# Patient Record
Sex: FEMALE | Race: WHITE | Employment: UNEMPLOYED | URBAN - METROPOLITAN AREA
[De-identification: names, ages, dates, MRNs, and addresses within clinical notes are randomized per-mention and may not be internally consistent; named-entity substitution may affect disease eponyms.]

---

## 2017-11-06 ENCOUNTER — HOSPITAL ENCOUNTER (OUTPATIENT)
Dept: MAMMOGRAPHY | Age: 57
Discharge: HOME | End: 2017-11-06
Attending: OBSTETRICS & GYNECOLOGY
Payer: COMMERCIAL

## 2017-11-06 DIAGNOSIS — N63.10 LUMP OF BREAST, RIGHT: ICD-10-CM

## 2017-11-06 PROCEDURE — 76642 ULTRASOUND BREAST LIMITED: CPT

## 2017-11-06 PROCEDURE — 77066 DX MAMMO INCL CAD BI: CPT

## 2018-01-09 PROBLEM — J30.89 CHRONIC NONSEASONAL ALLERGIC RHINITIS DUE TO POLLEN: Status: ACTIVE | Noted: 2018-01-09

## 2018-01-09 PROBLEM — I10 BENIGN ESSENTIAL HTN: Status: ACTIVE | Noted: 2018-01-09

## 2018-01-09 PROBLEM — Z78.0 POSTMENOPAUSAL: Status: ACTIVE | Noted: 2018-01-09

## 2018-01-09 PROBLEM — E78.00 PURE HYPERCHOLESTEROLEMIA: Status: ACTIVE | Noted: 2018-01-09

## 2018-01-09 PROBLEM — I35.1 MILD AORTIC INSUFFICIENCY: Status: ACTIVE | Noted: 2018-01-09

## 2018-01-30 ENCOUNTER — HOSPITAL ENCOUNTER (OUTPATIENT)
Dept: MAMMOGRAPHY | Age: 58
Discharge: HOME | End: 2018-01-30
Attending: OBSTETRICS & GYNECOLOGY
Payer: COMMERCIAL

## 2018-01-30 DIAGNOSIS — Z09 FOLLOW-UP EXAM, 3-6 MONTHS SINCE PREVIOUS EXAM: ICD-10-CM

## 2018-01-30 DIAGNOSIS — N63.10 BREAST MASS, RIGHT: ICD-10-CM

## 2018-01-30 PROCEDURE — 76642 ULTRASOUND BREAST LIMITED: CPT

## 2018-08-29 ENCOUNTER — HOSPITAL ENCOUNTER (OUTPATIENT)
Dept: MAMMOGRAPHY | Age: 58
Discharge: HOME | End: 2018-08-29
Attending: OBSTETRICS & GYNECOLOGY
Payer: COMMERCIAL

## 2018-08-29 DIAGNOSIS — Z09 FOLLOW-UP EXAM, 3-6 MONTHS SINCE PREVIOUS EXAM: ICD-10-CM

## 2018-08-29 DIAGNOSIS — N63.10 BREAST MASS, RIGHT: ICD-10-CM

## 2018-08-29 PROCEDURE — 76642 ULTRASOUND BREAST LIMITED: CPT

## 2018-09-06 PROBLEM — S93.401A SPRAIN OF RIGHT ANKLE: Status: ACTIVE | Noted: 2018-09-06

## 2019-01-14 ENCOUNTER — HOSPITAL ENCOUNTER (OUTPATIENT)
Dept: MAMMOGRAPHY | Age: 59
Discharge: HOME | End: 2019-01-14
Attending: OBSTETRICS & GYNECOLOGY
Payer: COMMERCIAL

## 2019-01-14 DIAGNOSIS — Z12.39 ENCOUNTER FOR SCREENING FOR MALIGNANT NEOPLASM OF BREAST: ICD-10-CM

## 2019-01-14 PROCEDURE — 77063 BREAST TOMOSYNTHESIS BI: CPT

## 2019-01-17 ENCOUNTER — HOSPITAL ENCOUNTER (OUTPATIENT)
Dept: MAMMOGRAPHY | Age: 59
Discharge: HOME | End: 2019-01-17
Attending: OBSTETRICS & GYNECOLOGY
Payer: COMMERCIAL

## 2019-01-17 DIAGNOSIS — R92.8 ABNORMAL SCREENING MAMMOGRAM: ICD-10-CM

## 2019-01-17 DIAGNOSIS — N63.0 BREAST LUMP: ICD-10-CM

## 2019-01-17 PROCEDURE — 77065 DX MAMMO INCL CAD UNI: CPT

## 2019-01-17 PROCEDURE — 76642 ULTRASOUND BREAST LIMITED: CPT

## 2019-01-25 ENCOUNTER — HOSPITAL ENCOUNTER (OUTPATIENT)
Dept: MAMMOGRAPHY | Age: 59
Discharge: HOME | End: 2019-01-25
Attending: OBSTETRICS & GYNECOLOGY
Payer: COMMERCIAL

## 2019-01-25 DIAGNOSIS — R92.8 ABNORMAL MAMMOGRAM OF LEFT BREAST: ICD-10-CM

## 2019-01-25 PROCEDURE — 19081 BX BREAST 1ST LESION STRTCTC: CPT

## 2019-01-25 PROCEDURE — 74011250636 HC RX REV CODE- 250/636: Performed by: OBSTETRICS & GYNECOLOGY

## 2019-01-25 PROCEDURE — 88305 TISSUE EXAM BY PATHOLOGIST: CPT

## 2019-01-25 PROCEDURE — 77065 DX MAMMO INCL CAD UNI: CPT

## 2019-01-25 PROCEDURE — 74011000250 HC RX REV CODE- 250: Performed by: OBSTETRICS & GYNECOLOGY

## 2019-01-25 RX ORDER — LIDOCAINE HYDROCHLORIDE 10 MG/ML
5 INJECTION INFILTRATION; PERINEURAL
Status: COMPLETED | OUTPATIENT
Start: 2019-01-25 | End: 2019-01-25

## 2019-01-25 RX ADMIN — SODIUM CHLORIDE 250 ML: 900 INJECTION, SOLUTION INTRAVENOUS at 10:00

## 2019-01-25 RX ADMIN — LIDOCAINE HYDROCHLORIDE 3 ML: 10 INJECTION, SOLUTION INFILTRATION; PERINEURAL at 10:00

## 2019-01-25 RX ADMIN — LIDOCAINE HYDROCHLORIDE 3 ML: 10; .005 INJECTION, SOLUTION EPIDURAL; INFILTRATION; INTRACAUDAL; PERINEURAL at 10:01

## 2019-09-03 ENCOUNTER — HOSPITAL ENCOUNTER (OUTPATIENT)
Dept: MAMMOGRAPHY | Age: 59
Discharge: HOME | End: 2019-09-03
Attending: OBSTETRICS & GYNECOLOGY
Payer: COMMERCIAL

## 2019-09-03 DIAGNOSIS — R59.9 ENLARGED LYMPH NODE: ICD-10-CM

## 2019-09-03 PROCEDURE — 76642 ULTRASOUND BREAST LIMITED: CPT

## 2019-09-03 PROCEDURE — 77065 DX MAMMO INCL CAD UNI: CPT

## 2020-02-01 ENCOUNTER — HOSPITAL ENCOUNTER (OUTPATIENT)
Dept: MAMMOGRAPHY | Age: 60
Discharge: HOME | End: 2020-02-01
Attending: OBSTETRICS & GYNECOLOGY
Payer: COMMERCIAL

## 2020-02-01 DIAGNOSIS — Z12.31 VISIT FOR SCREENING MAMMOGRAM: ICD-10-CM

## 2020-02-01 PROCEDURE — 77063 BREAST TOMOSYNTHESIS BI: CPT

## 2020-09-09 ENCOUNTER — HOSPITAL ENCOUNTER (OUTPATIENT)
Dept: MAMMOGRAPHY | Age: 60
Discharge: HOME | End: 2020-09-09
Attending: OBSTETRICS & GYNECOLOGY
Payer: COMMERCIAL

## 2020-09-09 DIAGNOSIS — N63.20 LEFT BREAST MASS: ICD-10-CM

## 2020-09-09 PROCEDURE — 76642 ULTRASOUND BREAST LIMITED: CPT

## 2020-09-09 PROCEDURE — 77066 DX MAMMO INCL CAD BI: CPT

## 2021-04-22 ENCOUNTER — TRANSCRIBE ORDER (OUTPATIENT)
Dept: SCHEDULING | Age: 61
End: 2021-04-22

## 2021-04-22 DIAGNOSIS — N60.19: Primary | ICD-10-CM

## 2021-06-02 ENCOUNTER — HOSPITAL ENCOUNTER (OUTPATIENT)
Dept: MAMMOGRAPHY | Age: 61
Discharge: HOME | End: 2021-06-02
Attending: OBSTETRICS & GYNECOLOGY
Payer: COMMERCIAL

## 2021-06-02 DIAGNOSIS — N60.19: ICD-10-CM

## 2021-06-02 PROCEDURE — 77066 DX MAMMO INCL CAD BI: CPT

## 2021-06-02 PROCEDURE — 76642 ULTRASOUND BREAST LIMITED: CPT

## 2022-02-08 ENCOUNTER — TRANSCRIBE ORDER (OUTPATIENT)
Dept: SCHEDULING | Age: 62
End: 2022-02-08

## 2022-02-08 DIAGNOSIS — Z12.39 ENCOUNTER FOR OTHER SCREENING FOR MALIGNANT NEOPLASM OF BREAST: Primary | ICD-10-CM

## 2022-03-19 PROBLEM — J30.89 CHRONIC NONSEASONAL ALLERGIC RHINITIS DUE TO POLLEN: Status: ACTIVE | Noted: 2018-01-09

## 2022-03-19 PROBLEM — S93.401A SPRAIN OF RIGHT ANKLE: Status: ACTIVE | Noted: 2018-09-06

## 2022-03-19 PROBLEM — I10 BENIGN ESSENTIAL HTN: Status: ACTIVE | Noted: 2018-01-09

## 2022-03-19 PROBLEM — Z78.0 POSTMENOPAUSAL: Status: ACTIVE | Noted: 2018-01-09

## 2022-03-19 PROBLEM — E78.00 PURE HYPERCHOLESTEROLEMIA: Status: ACTIVE | Noted: 2018-01-09

## 2022-03-20 PROBLEM — I35.1 MILD AORTIC INSUFFICIENCY: Status: ACTIVE | Noted: 2018-01-09

## 2022-03-21 ENCOUNTER — HOSPITAL ENCOUNTER (OUTPATIENT)
Dept: MAMMOGRAPHY | Age: 62
Discharge: HOME | End: 2022-03-21
Attending: PHYSICIAN ASSISTANT
Payer: COMMERCIAL

## 2022-03-21 DIAGNOSIS — Z12.39 ENCOUNTER FOR OTHER SCREENING FOR MALIGNANT NEOPLASM OF BREAST: ICD-10-CM

## 2022-03-21 PROCEDURE — 77063 BREAST TOMOSYNTHESIS BI: CPT

## 2022-12-15 ENCOUNTER — HOSPITAL ENCOUNTER (OUTPATIENT)
Dept: MAMMOGRAPHY | Age: 62
Discharge: HOME | End: 2022-12-15
Payer: COMMERCIAL

## 2022-12-15 ENCOUNTER — APPOINTMENT (OUTPATIENT)
Dept: MAMMOGRAPHY | Age: 62
End: 2022-12-15
Payer: COMMERCIAL

## 2022-12-15 DIAGNOSIS — N64.4 MASTODYNIA: ICD-10-CM

## 2022-12-15 PROCEDURE — 76642 ULTRASOUND BREAST LIMITED: CPT

## 2022-12-15 PROCEDURE — 77066 DX MAMMO INCL CAD BI: CPT

## 2023-02-20 ENCOUNTER — HOSPITAL ENCOUNTER (OUTPATIENT)
Dept: MAMMOGRAPHY | Age: 63
Discharge: HOME | End: 2023-02-23
Payer: COMMERCIAL

## 2023-02-20 DIAGNOSIS — N95.1 SYMPTOMATIC MENOPAUSAL OR FEMALE CLIMACTERIC STATES: ICD-10-CM

## 2023-02-20 DIAGNOSIS — N95.9 UNSPECIFIED MENOPAUSAL AND PERIMENOPAUSAL DISORDER: ICD-10-CM

## 2023-02-20 PROCEDURE — 77080 DXA BONE DENSITY AXIAL: CPT

## 2024-01-08 ENCOUNTER — HOSPITAL ENCOUNTER (OUTPATIENT)
Dept: MAMMOGRAPHY | Age: 64
Discharge: HOME | End: 2024-01-11
Payer: COMMERCIAL

## 2024-01-08 VITALS — BODY MASS INDEX: 20.72 KG/M2 | WEIGHT: 132 LBS | HEIGHT: 67 IN

## 2024-01-08 DIAGNOSIS — Z12.31 VISIT FOR SCREENING MAMMOGRAM: ICD-10-CM

## 2024-01-08 PROCEDURE — 77063 BREAST TOMOSYNTHESIS BI: CPT

## 2024-06-10 ENCOUNTER — HOSPITAL ENCOUNTER (OUTPATIENT)
Dept: PHYSICAL THERAPY | Age: 64
Setting detail: RECURRING SERIES
Discharge: HOME | End: 2024-06-13
Payer: COMMERCIAL

## 2024-06-10 DIAGNOSIS — N39.46 MIXED STRESS AND URGE URINARY INCONTINENCE: ICD-10-CM

## 2024-06-10 DIAGNOSIS — M62.81 MUSCLE WEAKNESS (GENERALIZED): Primary | ICD-10-CM

## 2024-06-10 PROCEDURE — 97530 THERAPEUTIC ACTIVITIES: CPT

## 2024-06-10 PROCEDURE — 97161 PT EVAL LOW COMPLEX 20 MIN: CPT

## 2024-06-10 ASSESSMENT — PAIN SCALES - GENERAL: PAINLEVEL_OUTOF10: 2

## 2024-06-10 NOTE — PROGRESS NOTES
Posture/Ergonomics     Diaphragmatic Breathing     Pain Science Education     Resources and Technology     Other Patient Education       MANUAL THERAPY: (0 minutes): Joint mobilization, soft tissue mobilization and manipulation was utilized and necessary because of the patient's restricted joint motion, painful spasm, loss of articular motion and restricted motion of soft tissue.     Date Type Location Comments   6/10/24 Internal assessment/treatment Via vaginal canal Global mms weakness / dec PFM bulk                (Used abbreviations: MET - muscle energy technique; SCS- Strain counter strain; CTM-Connective tissue mobilizations; CR- Contract/Relax; SP- Sustained pressure; PIT- Positional inhibition techniques; STM Soft -tissue mobilization; MM- Myofascial mobilization; TrP-Trigger point release; IASTM- Instrument assisted soft tissue mobilizations, TDN-Trigger point dry needling)      Treatment/Session Summary:    Treatment Assessment: See Evaluation Note from today. Patient presenting with under active pelvic floor with mild force production deficits. Instructed on performance of QF and long holds with her demonstrating appropriate coordination and needing minimal cueing to avoid compensation from glutes. Provided HEP for pt to practice until next session. Educated on techniques to help control urine throughout the day.  Communication/Consultation: Therapy Evaluation sent to referring provider  Equipment provided: HEP  Recommendations/Intent for next treatment session: Next visit will review her bladder diary and assess LE strength    >Total Treatment Billable Duration: 38 minutes  Time In: 1106  Time Out: 1201     Bell Johnson PT, DPT    Charge Capture  [employer] [facility] Portal  Appt Desk  Attendance Report      Future Appointments   Date Time Provider Department Center   6/24/2024  3:00 PM Bell Johnson PT [facility]   7/10/2024  1:00 PM Bell Johnson PT [facility]

## 2024-06-10 NOTE — THERAPY EVALUATION
above deficits affecting participation in basic ADLs and overall functional tolerance.     REASON FOR SERVICES/ OTHER COMMENTS:  Patient continues to require skilled intervention due to impairments affecting functional mobility.    Total Duration:  Time In: 1106  Time Out: 1201     Regarding Jane Díaz's therapy, I certify that the treatment plan above will be carried out by a therapist or under their direction.    Thank you for this referral,    Bell Johnson, PT, DPT    Referring Physician Signature: Geovanna Plaza MD  _______________________________ Date _____________        Post Session Pain  Charge Capture  PT Visit Info  MD Guidelines  MyChart  Events  Attendance Report

## 2024-06-24 ENCOUNTER — HOSPITAL ENCOUNTER (OUTPATIENT)
Dept: PHYSICAL THERAPY | Age: 64
Setting detail: RECURRING SERIES
Discharge: HOME | End: 2024-06-27
Payer: COMMERCIAL

## 2024-06-24 PROCEDURE — 97530 THERAPEUTIC ACTIVITIES: CPT

## 2024-06-24 PROCEDURE — 97110 THERAPEUTIC EXERCISES: CPT

## 2024-06-24 ASSESSMENT — PAIN SCALES - GENERAL: PAINLEVEL_OUTOF10: 0

## 2024-06-24 NOTE — PROGRESS NOTES
Jane Díaz  : 1960  Primary: [facility] [place]  Secondary:  [facility] [place] [address]  [place] [address]  Phone: [phone]  Fax: [phone] Plan Frequency: 1x every 2 weeks  Plan of Care/Certification Expiration Date: 24            Plan of Care/Certification Expiration Date:  Plan of Care/Certification Expiration Date: 24    Frequency/Duration:   Plan Frequency: 1x every 2 weeks      Time In/Out:   Time In: 1504  Time Out: 1603      PT Visit Info:    Plan Frequency: 1x every 2 weeks  Total # of Visits Approved: 16  Total # of Visits to Date: 2  Progress Note Counter: 2      Visit Count:  2    OUTPATIENT PHYSICAL THERAPY:   Treatment Note 2024       Charge Capture        Episode  (PF PT)               Treatment Diagnosis:    Muscle weakness (generalized)  Mixed stress and urge urinary incontinence    Medical/Referring Diagnosis:   Uterovaginal prolapse, unspecified  Cystocele, unspecified  Weakness of pelvic floor   Referring Physician: Geovanna Plaza MD MD Orders: PT Eval and Treat   Return MD Appt:  24 for nerve block  Date of Onset: Onset Date:  (chronic with UI; POP noted )    Allergies: Patient has no allergy information on record.    Restrictions/Precautions:  skin CA on face (remission); allergic to adhesives        Interventions Planned (Treatment may consist of any combination of the following): See Assessment Note      Subjective Comments: she is now able to hold her urine for up to 3 hrs. She has been trying to avoid just in case peeing. The urge suppression technique has been working well. She did have one episode of TAYLOR with coughing but after she did the knack and it helped. She can't figure out what is causing her to wake during the night - to urinate vs poor sleep, only waking 2x/night now. Her sx had improved so much that she did not complete the bladder diary.     Pt gives verbal consent to

## 2025-01-13 ENCOUNTER — HOSPITAL ENCOUNTER (OUTPATIENT)
Dept: MAMMOGRAPHY | Age: 65
Discharge: HOME | End: 2025-01-16
Attending: OBSTETRICS & GYNECOLOGY
Payer: COMMERCIAL

## 2025-01-13 DIAGNOSIS — Z12.39 ENCOUNTER FOR OTHER SCREENING FOR MALIGNANT NEOPLASM OF BREAST: ICD-10-CM

## 2025-01-13 PROCEDURE — 77067 SCR MAMMO BI INCL CAD: CPT

## 2025-02-04 ENCOUNTER — TRANSCRIBE ORDERS (OUTPATIENT)
Dept: SCHEDULING | Age: 65
End: 2025-02-04

## 2025-02-04 DIAGNOSIS — Z12.31 OTHER SCREENING MAMMOGRAM: Primary | ICD-10-CM
